# Patient Record
Sex: MALE | Race: WHITE | NOT HISPANIC OR LATINO | Employment: FULL TIME | URBAN - METROPOLITAN AREA
[De-identification: names, ages, dates, MRNs, and addresses within clinical notes are randomized per-mention and may not be internally consistent; named-entity substitution may affect disease eponyms.]

---

## 2019-03-28 ENCOUNTER — TELEPHONE (OUTPATIENT)
Dept: INTERNAL MEDICINE | Facility: CLINIC | Age: 49
End: 2019-03-28

## 2019-03-28 ENCOUNTER — DOCUMENTATION (OUTPATIENT)
Dept: INTERNAL MEDICINE | Facility: CLINIC | Age: 49
End: 2019-03-28

## 2019-03-28 ENCOUNTER — OFFICE VISIT (OUTPATIENT)
Dept: INTERNAL MEDICINE | Facility: CLINIC | Age: 49
End: 2019-03-28

## 2019-03-28 VITALS
HEIGHT: 74 IN | TEMPERATURE: 98.4 F | BODY MASS INDEX: 22.28 KG/M2 | HEART RATE: 67 BPM | WEIGHT: 173.6 LBS | RESPIRATION RATE: 20 BRPM | SYSTOLIC BLOOD PRESSURE: 122 MMHG | DIASTOLIC BLOOD PRESSURE: 84 MMHG | OXYGEN SATURATION: 98 %

## 2019-03-28 DIAGNOSIS — M54.50 ACUTE LOW BACK PAIN WITHOUT SCIATICA, UNSPECIFIED BACK PAIN LATERALITY: Primary | ICD-10-CM

## 2019-03-28 DIAGNOSIS — Z12.5 PROSTATE CANCER SCREENING: Primary | ICD-10-CM

## 2019-03-28 DIAGNOSIS — E78.5 HYPERLIPIDEMIA, UNSPECIFIED HYPERLIPIDEMIA TYPE: ICD-10-CM

## 2019-03-28 PROBLEM — I77.810 DILATED AORTIC ROOT: Status: ACTIVE | Noted: 2019-03-28

## 2019-03-28 PROCEDURE — 99213 OFFICE O/P EST LOW 20 MIN: CPT | Performed by: INTERNAL MEDICINE

## 2019-03-28 RX ORDER — MELOXICAM 7.5 MG/1
7.5 TABLET ORAL DAILY
Qty: 30 TABLET | Refills: 0 | Status: SHIPPED | OUTPATIENT
Start: 2019-03-28 | End: 2020-10-08

## 2019-03-28 RX ORDER — CYCLOBENZAPRINE HCL 10 MG
10 TABLET ORAL
Qty: 30 TABLET | Refills: 0 | Status: SHIPPED | OUTPATIENT
Start: 2019-03-28 | End: 2020-10-08

## 2019-03-28 RX ORDER — NAPROXEN SODIUM 220 MG
220 TABLET ORAL 2 TIMES DAILY PRN
COMMUNITY
End: 2020-10-08

## 2019-03-28 NOTE — TELEPHONE ENCOUNTER
Called patient, he did not want to schedule his follow up 5 mos with labs at this time , but will call back to schedule.

## 2019-03-28 NOTE — PROGRESS NOTES
Subjective   Tae Jung is a 48 y.o. male who presents for     Chief Complaint   Patient presents with   • Follow-up     C/o back pain         PHQ-2 Depression Screening  Little interest or pleasure in doing things? 0   Feeling down, depressed, or hopeless? 0   PHQ-2 Total Score 0             His back has been  Hurting for 3 weeks. He did not injure it. He denies leg weakness or incontinence. Heating pad may help. Mowing the grass may have mad it worse. He has taken Naproxen twice. The pain can go down his left leg some.       Patient Active Problem List   Diagnosis   • Acute low back pain without sciatica   • Hyperlipemia   • Dilated aortic root (CMS/HCC)       No Known Allergies    Current Outpatient Medications on File Prior to Visit   Medication Sig Dispense Refill   • naproxen sodium (ALEVE) 220 MG tablet Take 220 mg by mouth 2 (Two) Times a Day As Needed.       No current facility-administered medications on file prior to visit.        Past Medical History:   Diagnosis Date   • Allergic    • Aortic root enlargement (CMS/HCC)    • Back pain    • Gilbert's disease    • Hemorrhoids    • Hyperlipidemia    • Hypertension    • Low back pain    • Low HDL (under 40)    • Rectal bleeding        Family History   Problem Relation Age of Onset   • Diabetes Mother    • Hypertension Mother    • Hyperlipidemia Mother    • Kidney disease Mother    • Lung cancer Father        Social History     Socioeconomic History   • Marital status:      Spouse name: Not on file   • Number of children: Not on file   • Years of education: Not on file   • Highest education level: Not on file   Tobacco Use   • Smoking status: Never Smoker   • Smokeless tobacco: Never Used   Substance and Sexual Activity   • Alcohol use: Yes     Alcohol/week: 0.6 oz     Types: 1 Glasses of wine per week     Comment: occassional   • Drug use: No   • Sexual activity: Yes     Partners: Female       Past Surgical History:   Procedure Laterality Date  "  • COLONOSCOPY  2006   • HERNIA REPAIR     • KNEE ACL RECONSTRUCTION     • WISDOM TOOTH EXTRACTION         The following portions of the patient's history were reviewed and updated as appropriate: problem list, allergies, past medical history, past family history, past social history and past surgical history.    Review of Systems   Constitutional: Negative for chills and fever.   Musculoskeletal: Positive for back pain.       Objective   Vitals:    03/28/19 1048   BP: 122/84   Pulse: 67   Resp: 20   Temp: 98.4 °F (36.9 °C)   TempSrc: Oral   SpO2: 98%   Weight: 78.7 kg (173 lb 9.6 oz)   Height: 188 cm (74\")     Physical Exam   Constitutional: He appears well-developed and well-nourished.   Cardiovascular: Normal rate, regular rhythm and normal heart sounds.   Pulmonary/Chest: Effort normal and breath sounds normal.   Musculoskeletal:   5/5 strength in his legs  Neg SLR  1+ DTRs in legs   Vitals reviewed.      Procedures    Assessment/Plan   Tae was seen today for follow-up.    Diagnoses and all orders for this visit:    Acute low back pain without sciatica, unspecified back pain laterality    Hyperlipidemia, unspecified hyperlipidemia type  -     Comprehensive metabolic panel; Future  -     Lipid Panel w/ Chol/HDL Ratio; Future    Other orders  -     meloxicam (MOBIC) 7.5 MG tablet; Take 1 tablet by mouth Daily.  -     cyclobenzaprine (FLEXERIL) 10 MG tablet; Take 1 tablet by mouth Daily With Dinner.        Return in about 5 months (around 8/28/2019).             "

## 2019-08-28 ENCOUNTER — RESULTS ENCOUNTER (OUTPATIENT)
Dept: INTERNAL MEDICINE | Facility: CLINIC | Age: 49
End: 2019-08-28

## 2019-08-28 DIAGNOSIS — E78.5 HYPERLIPIDEMIA, UNSPECIFIED HYPERLIPIDEMIA TYPE: ICD-10-CM

## 2019-08-28 DIAGNOSIS — Z12.5 PROSTATE CANCER SCREENING: ICD-10-CM

## 2020-03-27 ENCOUNTER — TELEPHONE (OUTPATIENT)
Dept: INTERNAL MEDICINE | Facility: CLINIC | Age: 50
End: 2020-03-27

## 2020-03-27 DIAGNOSIS — Z00.00 WELLNESS EXAMINATION: Primary | ICD-10-CM

## 2020-03-27 DIAGNOSIS — Z12.5 PROSTATE CANCER SCREENING: ICD-10-CM

## 2020-03-27 RX ORDER — ALBUTEROL SULFATE 90 UG/1
2 AEROSOL, METERED RESPIRATORY (INHALATION) EVERY 6 HOURS PRN
Qty: 6.7 G | Refills: 0 | Status: SHIPPED | OUTPATIENT
Start: 2020-03-27 | End: 2020-10-08

## 2020-06-25 ENCOUNTER — RESULTS ENCOUNTER (OUTPATIENT)
Dept: INTERNAL MEDICINE | Facility: CLINIC | Age: 50
End: 2020-06-25

## 2020-06-25 DIAGNOSIS — Z12.5 PROSTATE CANCER SCREENING: ICD-10-CM

## 2020-06-25 DIAGNOSIS — Z00.00 WELLNESS EXAMINATION: ICD-10-CM

## 2020-08-21 ENCOUNTER — TELEPHONE (OUTPATIENT)
Dept: INTERNAL MEDICINE | Facility: CLINIC | Age: 50
End: 2020-08-21

## 2020-10-08 ENCOUNTER — OFFICE VISIT (OUTPATIENT)
Dept: INTERNAL MEDICINE | Facility: CLINIC | Age: 50
End: 2020-10-08

## 2020-10-08 VITALS
SYSTOLIC BLOOD PRESSURE: 122 MMHG | HEIGHT: 74 IN | DIASTOLIC BLOOD PRESSURE: 78 MMHG | RESPIRATION RATE: 20 BRPM | TEMPERATURE: 97.3 F | OXYGEN SATURATION: 96 % | HEART RATE: 81 BPM | WEIGHT: 163.6 LBS | BODY MASS INDEX: 21 KG/M2

## 2020-10-08 DIAGNOSIS — Z11.59 NEED FOR HEPATITIS C SCREENING TEST: ICD-10-CM

## 2020-10-08 DIAGNOSIS — Z12.5 SCREENING FOR PROSTATE CANCER: ICD-10-CM

## 2020-10-08 DIAGNOSIS — Z00.00 WELLNESS EXAMINATION: Primary | ICD-10-CM

## 2020-10-08 DIAGNOSIS — I77.810 DILATED AORTIC ROOT (HCC): ICD-10-CM

## 2020-10-08 PROBLEM — E78.49 OTHER HYPERLIPIDEMIA: Status: ACTIVE | Noted: 2019-03-28

## 2020-10-08 PROCEDURE — 99396 PREV VISIT EST AGE 40-64: CPT | Performed by: INTERNAL MEDICINE

## 2020-10-08 PROCEDURE — 90686 IIV4 VACC NO PRSV 0.5 ML IM: CPT | Performed by: INTERNAL MEDICINE

## 2020-10-08 PROCEDURE — 90471 IMMUNIZATION ADMIN: CPT | Performed by: INTERNAL MEDICINE

## 2020-10-09 ENCOUNTER — RESULTS ENCOUNTER (OUTPATIENT)
Dept: INTERNAL MEDICINE | Facility: CLINIC | Age: 50
End: 2020-10-09

## 2020-10-09 DIAGNOSIS — Z00.00 WELLNESS EXAMINATION: ICD-10-CM

## 2020-10-09 DIAGNOSIS — Z11.59 NEED FOR HEPATITIS C SCREENING TEST: ICD-10-CM

## 2020-10-09 DIAGNOSIS — Z12.5 SCREENING FOR PROSTATE CANCER: ICD-10-CM

## 2020-10-30 LAB
ALBUMIN SERPL-MCNC: 4.8 G/DL (ref 3.5–5.2)
ALBUMIN/GLOB SERPL: 2.7 G/DL
ALP SERPL-CCNC: 75 U/L (ref 39–117)
ALT SERPL-CCNC: 26 U/L (ref 1–41)
AST SERPL-CCNC: 20 U/L (ref 1–40)
BILIRUB SERPL-MCNC: 1.2 MG/DL (ref 0–1.2)
BUN SERPL-MCNC: 12 MG/DL (ref 6–20)
BUN/CREAT SERPL: 13.8 (ref 7–25)
CALCIUM SERPL-MCNC: 9.6 MG/DL (ref 8.6–10.5)
CHLORIDE SERPL-SCNC: 103 MMOL/L (ref 98–107)
CHOLEST SERPL-MCNC: 212 MG/DL (ref 0–200)
CHOLEST/HDLC SERPL: 5.73 {RATIO}
CO2 SERPL-SCNC: 30.3 MMOL/L (ref 22–29)
CREAT SERPL-MCNC: 0.87 MG/DL (ref 0.76–1.27)
GLOBULIN SER CALC-MCNC: 1.8 GM/DL
GLUCOSE SERPL-MCNC: 89 MG/DL (ref 65–99)
HCV AB S/CO SERPL IA: <0.1 S/CO RATIO (ref 0–0.9)
HDLC SERPL-MCNC: 37 MG/DL (ref 40–60)
LDLC SERPL CALC-MCNC: 137 MG/DL (ref 0–100)
POTASSIUM SERPL-SCNC: 4.1 MMOL/L (ref 3.5–5.2)
PROT SERPL-MCNC: 6.6 G/DL (ref 6–8.5)
PSA SERPL-MCNC: 0.58 NG/ML (ref 0–4)
SODIUM SERPL-SCNC: 141 MMOL/L (ref 136–145)
TRIGL SERPL-MCNC: 210 MG/DL (ref 0–150)
VLDLC SERPL CALC-MCNC: 38 MG/DL (ref 5–40)

## 2020-11-05 ENCOUNTER — TELEPHONE (OUTPATIENT)
Dept: INTERNAL MEDICINE | Facility: CLINIC | Age: 50
End: 2020-11-05

## 2020-11-05 NOTE — TELEPHONE ENCOUNTER
PATIENT IS CALLING BECAUSE HE HAS COVID-19. HE HAS QUESTIONS FOR . PLEASE CALL HIM BACK AS SOON AS POSSIBLE.    BEST PH#: [phone]

## 2020-11-09 ENCOUNTER — TELEPHONE (OUTPATIENT)
Dept: INTERNAL MEDICINE | Facility: CLINIC | Age: 50
End: 2020-11-09

## 2020-11-09 NOTE — TELEPHONE ENCOUNTER
He needs to check a pulse ox if possible and if below 92 then needs to go to hospital    If no pulse ox then would recc Er if getting more short of breath    He can use heating pad for pulled muscle

## 2020-11-09 NOTE — TELEPHONE ENCOUNTER
PATIENT CALLED AND STATED HE TESTED POSITIVE 11/3/20 FOR COVID-19  PATIENT STATES HE THINKS HE PULLED A MUSCLE IN HIS CHEST AND IT IS SOMEWHAT DIFFICULT FOR HIM TO BREATHE.  PATIENT WOULD LIKE A CALL BACK WITH WHAT STEPS HE SHOULD TAKE NEXT.     PLEASE ADVISE: [phone]

## 2021-06-17 ENCOUNTER — TELEPHONE (OUTPATIENT)
Dept: INTERNAL MEDICINE | Facility: CLINIC | Age: 51
End: 2021-06-17

## 2021-06-17 NOTE — TELEPHONE ENCOUNTER
Caller: Sharla Jung    Relationship: Self    Best call back number: [phone] ([phone]    What is the best time to reach you: ANYTIME    Who are you requesting to speak with (clinical staff, provider,  specific staff member): DR RODRIGUEZ    Do you know the name of the person who called: SHARLA JUNG    What was the call regarding: PATIENT STATED RECEIVED THE 2ND COVID VACCINE APPROXIMATELY 6 WEEKS ON April 29, 2021, NOW PATIENT OFF AND ON IS EXPERIENCING FATIGUE AND FEELS LIKE LEGS ARE FILLED WITH LEAD WHEN HE GOES RUNNING, NOT FEELING 100%, PATIENT WANTS TO KNOW WHAT THE NEXT STEPS ARE, PLEASE ADVISE    Do you require a callback: YES

## 2021-06-18 ENCOUNTER — OFFICE VISIT (OUTPATIENT)
Dept: INTERNAL MEDICINE | Facility: CLINIC | Age: 51
End: 2021-06-18

## 2021-06-18 VITALS
SYSTOLIC BLOOD PRESSURE: 122 MMHG | HEIGHT: 74 IN | DIASTOLIC BLOOD PRESSURE: 78 MMHG | BODY MASS INDEX: 20.79 KG/M2 | WEIGHT: 162 LBS | TEMPERATURE: 97.8 F

## 2021-06-18 DIAGNOSIS — I77.810 DILATED AORTIC ROOT (HCC): Primary | ICD-10-CM

## 2021-06-18 DIAGNOSIS — R53.83 FATIGUE, UNSPECIFIED TYPE: ICD-10-CM

## 2021-06-18 PROCEDURE — 99214 OFFICE O/P EST MOD 30 MIN: CPT | Performed by: INTERNAL MEDICINE

## 2021-06-18 PROCEDURE — 93000 ELECTROCARDIOGRAM COMPLETE: CPT | Performed by: INTERNAL MEDICINE

## 2021-06-18 NOTE — PROGRESS NOTES
Subjective        Chief Complaint   Patient presents with   • Fatigue   • Lower Extremity Issue           Tae Jung is a 51 y.o. male who presents for    Patient Active Problem List   Diagnosis   • Other hyperlipidemia   • Dilated aortic root (CMS/HCC)       History of Present Illness     He got his second COVID shot on April 29th. He is tired after some of his runs; it can start 6-7 miles into his runs. His legs feel heavy. His thighs can feel like he has weights on them. He denies chest pain or dyspnea. He thinks it may have started after his second COVID shot.  No Known Allergies    No current outpatient medications on file prior to visit.     No current facility-administered medications on file prior to visit.       Past Medical History:   Diagnosis Date   • Allergic    • Back pain    • Gilbert's disease    • Hemorrhoids    • Hypertension    • Low back pain    • Low HDL (under 40)    • Rectal bleeding        Past Surgical History:   Procedure Laterality Date   • COLONOSCOPY  04/10/2019    repeat 10 years Dr. Montez   • HERNIA REPAIR     • KNEE ACL RECONSTRUCTION     • WISDOM TOOTH EXTRACTION         Family History   Problem Relation Age of Onset   • Diabetes Mother    • Hypertension Mother    • Hyperlipidemia Mother    • Kidney disease Mother    • Lung cancer Father        Social History     Socioeconomic History   • Marital status:      Spouse name: Not on file   • Number of children: Not on file   • Years of education: Not on file   • Highest education level: Not on file   Tobacco Use   • Smoking status: Never Smoker   • Smokeless tobacco: Never Used   Substance and Sexual Activity   • Alcohol use: Yes     Alcohol/week: 1.0 standard drinks     Types: 1 Glasses of wine per week     Comment: occassional   • Drug use: No   • Sexual activity: Yes     Partners: Female           The following portions of the patient's history were reviewed and updated as appropriate: problem list, allergies, current  "medications, past medical history, past family history, past social history and past surgical history.    Review of Systems    Immunization History   Administered Date(s) Administered   • COVID-19 (PFIZER) 04/01/2021, 04/29/2021   • DTaP 01/01/2005   • Flu Vaccine Quad PF >18YRS 01/04/2019   • Flu Vaccine Quad PF >36MO 10/01/2018   • Flulaval/Fluarix/Fluzone Quad 10/08/2020   • Hepatitis A 07/03/2018, 01/04/2019   • Tdap 04/26/2016       Objective   Vitals:    06/18/21 1538   BP: 122/78   Temp: 97.8 °F (36.6 °C)   Weight: 73.5 kg (162 lb)   Height: 188 cm (74\")     Body mass index is 20.8 kg/m².  Physical Exam  Vitals reviewed.   Constitutional:       Appearance: He is well-developed.   HENT:      Head: Normocephalic and atraumatic.   Cardiovascular:      Rate and Rhythm: Normal rate and regular rhythm.      Heart sounds: Normal heart sounds, S1 normal and S2 normal.      Comments: 2+ DP/PT in both feet  Pulmonary:      Effort: Pulmonary effort is normal.      Breath sounds: Normal breath sounds.   Skin:     General: Skin is warm.   Neurological:      Mental Status: He is alert.   Psychiatric:         Behavior: Behavior normal.           ECG 12 Lead    Date/Time: 6/18/2021 5:27 PM  Performed by: Dejon Templeton MD  Authorized by: Dejon Templeton MD   Comparison: not compared with previous ECG   Rhythm: sinus rhythm  Rate: normal  Conduction: incomplete right bundle branch block  Other findings: left ventricular hypertrophy    Clinical impression: abnormal EKG            Assessment/Plan   Diagnoses and all orders for this visit:    1. Dilated aortic root (CMS/HCC) (Primary)  -     Adult Transthoracic Echo Complete W/ Cont if Necessary Per Protocol; Future  -     ECG 12 Lead    2. Fatigue, unspecified type  -     CBC & Differential  -     Comprehensive Metabolic Panel  -     TSH  -     CK             No evidence of pericarditis on EKG. I do not have any of his old EKGs from [facility] to compare to see if LVH is " new. He is due to look at his aortic root so I will get an echo next week to look at root and for LVH; encouraged not to run until we get.    Labs next week given some fatigue since second COVID shot. He does not have symptoms or exam c/w PAD.    No follow-ups on file.  Answers for HPI/ROS submitted by the patient on 6/18/2021  What is the primary reason for your visit?: Other  Please describe your symptoms.: Legs feel really heavy. Little more fatigued in the evening than usual.  Have you had these symptoms before?: No  How long have you been having these symptoms?: Greater than 2 weeks  Please describe any probable cause for these symptoms. : COVID and/or vaccine?

## 2021-06-22 LAB
ALBUMIN SERPL-MCNC: 5 G/DL (ref 3.5–5.2)
ALBUMIN/GLOB SERPL: 2.9 G/DL
ALP SERPL-CCNC: 73 U/L (ref 39–117)
ALT SERPL-CCNC: 16 U/L (ref 1–41)
AST SERPL-CCNC: 15 U/L (ref 1–40)
BASOPHILS # BLD AUTO: 0.05 10*3/MM3 (ref 0–0.2)
BASOPHILS NFR BLD AUTO: 1.1 % (ref 0–1.5)
BILIRUB SERPL-MCNC: 1.3 MG/DL (ref 0–1.2)
BUN SERPL-MCNC: 9 MG/DL (ref 6–20)
BUN/CREAT SERPL: 9.7 (ref 7–25)
CALCIUM SERPL-MCNC: 10.3 MG/DL (ref 8.6–10.5)
CHLORIDE SERPL-SCNC: 103 MMOL/L (ref 98–107)
CK SERPL-CCNC: 85 U/L (ref 20–200)
CO2 SERPL-SCNC: 27.7 MMOL/L (ref 22–29)
CREAT SERPL-MCNC: 0.93 MG/DL (ref 0.76–1.27)
EOSINOPHIL # BLD AUTO: 0.32 10*3/MM3 (ref 0–0.4)
EOSINOPHIL NFR BLD AUTO: 7 % (ref 0.3–6.2)
ERYTHROCYTE [DISTWIDTH] IN BLOOD BY AUTOMATED COUNT: 12.5 % (ref 12.3–15.4)
GLOBULIN SER CALC-MCNC: 1.7 GM/DL
GLUCOSE SERPL-MCNC: 98 MG/DL (ref 65–99)
HCT VFR BLD AUTO: 41.1 % (ref 37.5–51)
HGB BLD-MCNC: 13.6 G/DL (ref 13–17.7)
IMM GRANULOCYTES # BLD AUTO: 0.01 10*3/MM3 (ref 0–0.05)
IMM GRANULOCYTES NFR BLD AUTO: 0.2 % (ref 0–0.5)
LYMPHOCYTES # BLD AUTO: 1.42 10*3/MM3 (ref 0.7–3.1)
LYMPHOCYTES NFR BLD AUTO: 30.9 % (ref 19.6–45.3)
MCH RBC QN AUTO: 30 PG (ref 26.6–33)
MCHC RBC AUTO-ENTMCNC: 33.1 G/DL (ref 31.5–35.7)
MCV RBC AUTO: 90.5 FL (ref 79–97)
MONOCYTES # BLD AUTO: 0.4 10*3/MM3 (ref 0.1–0.9)
MONOCYTES NFR BLD AUTO: 8.7 % (ref 5–12)
NEUTROPHILS # BLD AUTO: 2.4 10*3/MM3 (ref 1.7–7)
NEUTROPHILS NFR BLD AUTO: 52.1 % (ref 42.7–76)
NRBC BLD AUTO-RTO: 0 /100 WBC (ref 0–0.2)
PLATELET # BLD AUTO: 201 10*3/MM3 (ref 140–450)
POTASSIUM SERPL-SCNC: 4.3 MMOL/L (ref 3.5–5.2)
PROT SERPL-MCNC: 6.7 G/DL (ref 6–8.5)
RBC # BLD AUTO: 4.54 10*6/MM3 (ref 4.14–5.8)
SODIUM SERPL-SCNC: 140 MMOL/L (ref 136–145)
TSH SERPL DL<=0.005 MIU/L-ACNC: 1.61 UIU/ML (ref 0.27–4.2)
WBC # BLD AUTO: 4.6 10*3/MM3 (ref 3.4–10.8)

## 2021-06-28 ENCOUNTER — HOSPITAL ENCOUNTER (OUTPATIENT)
Dept: CARDIOLOGY | Facility: HOSPITAL | Age: 51
Discharge: HOME | End: 2021-06-28
Admitting: INTERNAL MEDICINE

## 2021-06-28 VITALS
HEIGHT: 74 IN | WEIGHT: 162 LBS | HEART RATE: 64 BPM | OXYGEN SATURATION: 98 % | SYSTOLIC BLOOD PRESSURE: 120 MMHG | BODY MASS INDEX: 20.79 KG/M2 | DIASTOLIC BLOOD PRESSURE: 80 MMHG

## 2021-06-28 DIAGNOSIS — I77.810 DILATED AORTIC ROOT (HCC): ICD-10-CM

## 2021-06-28 PROCEDURE — 93306 TTE W/DOPPLER COMPLETE: CPT

## 2021-06-28 PROCEDURE — 93306 TTE W/DOPPLER COMPLETE: CPT | Performed by: INTERNAL MEDICINE

## 2021-06-29 LAB
AORTIC ARCH: 3.6 CM
ASCENDING AORTA: 3.5 CM
BH CV ECHO MEAS - ACS: 2.5 CM
BH CV ECHO MEAS - AO MAX PG (FULL): 1.1 MMHG
BH CV ECHO MEAS - AO MAX PG: 8.3 MMHG
BH CV ECHO MEAS - AO MEAN PG (FULL): 0.26 MMHG
BH CV ECHO MEAS - AO MEAN PG: 4.7 MMHG
BH CV ECHO MEAS - AO ROOT AREA (BSA CORRECTED): 1.9
BH CV ECHO MEAS - AO ROOT AREA: 11 CM^2
BH CV ECHO MEAS - AO ROOT DIAM: 3.7 CM
BH CV ECHO MEAS - AO V2 MAX: 143.9 CM/SEC
BH CV ECHO MEAS - AO V2 MEAN: 101.4 CM/SEC
BH CV ECHO MEAS - AO V2 VTI: 30.8 CM
BH CV ECHO MEAS - ASC AORTA: 3.5 CM
BH CV ECHO MEAS - AVA(I,A): 3.6 CM^2
BH CV ECHO MEAS - AVA(I,D): 3.6 CM^2
BH CV ECHO MEAS - AVA(V,A): 3.9 CM^2
BH CV ECHO MEAS - AVA(V,D): 3.9 CM^2
BH CV ECHO MEAS - BSA(HAYCOCK): 2 M^2
BH CV ECHO MEAS - BSA: 2 M^2
BH CV ECHO MEAS - BZI_BMI: 20.8 KILOGRAMS/M^2
BH CV ECHO MEAS - BZI_METRIC_HEIGHT: 188 CM
BH CV ECHO MEAS - BZI_METRIC_WEIGHT: 73.5 KG
BH CV ECHO MEAS - EDV(MOD-SP2): 96 ML
BH CV ECHO MEAS - EDV(MOD-SP4): 126 ML
BH CV ECHO MEAS - EDV(TEICH): 101.2 ML
BH CV ECHO MEAS - EF(CUBED): 77.5 %
BH CV ECHO MEAS - EF(MOD-BP): 60.3 %
BH CV ECHO MEAS - EF(MOD-SP2): 61.5 %
BH CV ECHO MEAS - EF(MOD-SP4): 61.1 %
BH CV ECHO MEAS - EF(TEICH): 69.6 %
BH CV ECHO MEAS - ESV(MOD-SP2): 37 ML
BH CV ECHO MEAS - ESV(MOD-SP4): 49 ML
BH CV ECHO MEAS - ESV(TEICH): 30.7 ML
BH CV ECHO MEAS - FS: 39.2 %
BH CV ECHO MEAS - IVS/LVPW: 1.1
BH CV ECHO MEAS - IVSD: 1.1 CM
BH CV ECHO MEAS - LAT PEAK E' VEL: 14.3 CM/SEC
BH CV ECHO MEAS - LV DIASTOLIC VOL/BSA (35-75): 63.4 ML/M^2
BH CV ECHO MEAS - LV MASS(C)D: 177.4 GRAMS
BH CV ECHO MEAS - LV MASS(C)DI: 89.3 GRAMS/M^2
BH CV ECHO MEAS - LV MAX PG: 7.2 MMHG
BH CV ECHO MEAS - LV MEAN PG: 4.5 MMHG
BH CV ECHO MEAS - LV SYSTOLIC VOL/BSA (12-30): 24.7 ML/M^2
BH CV ECHO MEAS - LV V1 MAX: 134 CM/SEC
BH CV ECHO MEAS - LV V1 MEAN: 101 CM/SEC
BH CV ECHO MEAS - LV V1 VTI: 26.9 CM
BH CV ECHO MEAS - LVIDD: 4.7 CM
BH CV ECHO MEAS - LVIDS: 2.8 CM
BH CV ECHO MEAS - LVLD AP2: 9 CM
BH CV ECHO MEAS - LVLD AP4: 8.6 CM
BH CV ECHO MEAS - LVLS AP2: 6.6 CM
BH CV ECHO MEAS - LVLS AP4: 7.2 CM
BH CV ECHO MEAS - LVOT AREA (M): 4.2 CM^2
BH CV ECHO MEAS - LVOT AREA: 4.1 CM^2
BH CV ECHO MEAS - LVOT DIAM: 2.3 CM
BH CV ECHO MEAS - LVPWD: 1 CM
BH CV ECHO MEAS - MED PEAK E' VEL: 7.5 CM/SEC
BH CV ECHO MEAS - MV A DUR: 0.1 SEC
BH CV ECHO MEAS - MV A MAX VEL: 52 CM/SEC
BH CV ECHO MEAS - MV DEC SLOPE: 366.7 CM/SEC^2
BH CV ECHO MEAS - MV DEC TIME: 0.15 SEC
BH CV ECHO MEAS - MV E MAX VEL: 70.4 CM/SEC
BH CV ECHO MEAS - MV E/A: 1.4
BH CV ECHO MEAS - MV MAX PG: 2.4 MMHG
BH CV ECHO MEAS - MV MEAN PG: 1.3 MMHG
BH CV ECHO MEAS - MV P1/2T MAX VEL: 79.3 CM/SEC
BH CV ECHO MEAS - MV P1/2T: 63.3 MSEC
BH CV ECHO MEAS - MV V2 MAX: 77.1 CM/SEC
BH CV ECHO MEAS - MV V2 MEAN: 54.2 CM/SEC
BH CV ECHO MEAS - MV V2 VTI: 28.5 CM
BH CV ECHO MEAS - MVA P1/2T LCG: 2.8 CM^2
BH CV ECHO MEAS - MVA(P1/2T): 3.5 CM^2
BH CV ECHO MEAS - MVA(VTI): 3.9 CM^2
BH CV ECHO MEAS - PA ACC TIME: 0.09 SEC
BH CV ECHO MEAS - PA MAX PG (FULL): 0.51 MMHG
BH CV ECHO MEAS - PA MAX PG: 3 MMHG
BH CV ECHO MEAS - PA PR(ACCEL): 39.8 MMHG
BH CV ECHO MEAS - PA V2 MAX: 86.9 CM/SEC
BH CV ECHO MEAS - PULM A REVS DUR: 0.07 SEC
BH CV ECHO MEAS - PULM A REVS VEL: 27.1 CM/SEC
BH CV ECHO MEAS - PULM DIAS VEL: 34.3 CM/SEC
BH CV ECHO MEAS - PULM S/D: 2
BH CV ECHO MEAS - PULM SYS VEL: 69.5 CM/SEC
BH CV ECHO MEAS - PVA(V,A): 3.7 CM^2
BH CV ECHO MEAS - PVA(V,D): 3.7 CM^2
BH CV ECHO MEAS - QP/QS: 0.75
BH CV ECHO MEAS - RAP SYSTOLE: 3 MMHG
BH CV ECHO MEAS - RV MAX PG: 2.5 MMHG
BH CV ECHO MEAS - RV MEAN PG: 1.7 MMHG
BH CV ECHO MEAS - RV V1 MAX: 79.3 CM/SEC
BH CV ECHO MEAS - RV V1 MEAN: 63.4 CM/SEC
BH CV ECHO MEAS - RV V1 VTI: 20.7 CM
BH CV ECHO MEAS - RVOT AREA: 4 CM^2
BH CV ECHO MEAS - RVOT DIAM: 2.3 CM
BH CV ECHO MEAS - RVSP: 28 MMHG
BH CV ECHO MEAS - SI(AO): 169.8 ML/M^2
BH CV ECHO MEAS - SI(CUBED): 39.9 ML/M^2
BH CV ECHO MEAS - SI(LVOT): 56.1 ML/M^2
BH CV ECHO MEAS - SI(MOD-SP2): 29.7 ML/M^2
BH CV ECHO MEAS - SI(MOD-SP4): 38.8 ML/M^2
BH CV ECHO MEAS - SI(TEICH): 35.5 ML/M^2
BH CV ECHO MEAS - SUP REN AO DIAM: 1.9 CM
BH CV ECHO MEAS - SV(AO): 337.3 ML
BH CV ECHO MEAS - SV(CUBED): 79.3 ML
BH CV ECHO MEAS - SV(LVOT): 111.5 ML
BH CV ECHO MEAS - SV(MOD-SP2): 59 ML
BH CV ECHO MEAS - SV(MOD-SP4): 77 ML
BH CV ECHO MEAS - SV(RVOT): 83.9 ML
BH CV ECHO MEAS - SV(TEICH): 70.5 ML
BH CV ECHO MEAS - TAPSE (>1.6): 2.5 CM
BH CV ECHO MEAS - TR MAX VEL: 249.8 CM/SEC
BH CV ECHO MEASUREMENTS AVERAGE E/E' RATIO: 6.46
BH CV VAS BP RIGHT ARM: NORMAL MMHG
BH CV XLRA - RV BASE: 3.3 CM
BH CV XLRA - RV LENGTH: 9 CM
BH CV XLRA - RV MID: 3.5 CM
BH CV XLRA - TDI S': 12.4 CM/SEC
LEFT ATRIUM VOLUME INDEX: 40 ML/M2
LV EF 2D ECHO EST: 60 %
MAXIMAL PREDICTED HEART RATE: 170 BPM
SINUS: 3.4 CM
STJ: 3.4 CM
STRESS TARGET HR: 145 BPM

## 2021-06-30 ENCOUNTER — TELEPHONE (OUTPATIENT)
Dept: INTERNAL MEDICINE | Facility: CLINIC | Age: 51
End: 2021-06-30

## 2022-12-07 ENCOUNTER — TELEPHONE (OUTPATIENT)
Dept: INTERNAL MEDICINE | Facility: CLINIC | Age: 52
End: 2022-12-07

## 2022-12-07 NOTE — PROGRESS NOTES
Subjective   Chief Complaint   Patient presents with   • Nail Problem     Losing toenail from training for trail race   • left knee pain   • toe nail trauma       History of Present Illness   52 y.o. male presents with cc pain in left knee and losing right great toenail; he is followed by Dr. Templeton.     Reports he is about to lose the toenail on his right great toe. He has been trail running. No pain with it or drainage. Only attached at the medial aspect with bruising noted to the nail bed.     Also having pain in left knee when he runs ongoing for 4 weeks. NKI. No swelling or erythema. No popping. No OTC meds for it. He took 2 weeks off and started running again but only 10-12 miles a week now. Previous knee surgery with Dr. Funes in the past and he wishes to see him again but needs a referral.      Patient Active Problem List   Diagnosis   • Other hyperlipidemia   • Dilated aortic root (HCC)       No Known Allergies    No current outpatient medications on file prior to visit.     No current facility-administered medications on file prior to visit.       Past Medical History:   Diagnosis Date   • Allergic    • Back pain    • Gilbert's disease    • Hemorrhoids    • Hypertension    • Low back pain    • Low HDL (under 40)    • Rectal bleeding        Family History   Problem Relation Age of Onset   • Diabetes Mother    • Hypertension Mother    • Hyperlipidemia Mother    • Kidney disease Mother    • Lung cancer Father        Social History     Socioeconomic History   • Marital status:    Tobacco Use   • Smoking status: Never   • Smokeless tobacco: Never   Substance and Sexual Activity   • Alcohol use: Yes     Alcohol/week: 1.0 standard drink     Types: 1 Glasses of wine per week     Comment: occassional   • Drug use: No   • Sexual activity: Yes     Partners: Female       Past Surgical History:   Procedure Laterality Date   • COLONOSCOPY  04/10/2019    repeat 10 years Dr. Montez   • HERNIA REPAIR     • KNEE  "ACL RECONSTRUCTION     • WISDOM TOOTH EXTRACTION         The following portions of the patient's history were reviewed and updated as appropriate: problem list, allergies, current medications, past medical history, past social history and past surgical history.    Review of Systems    Immunization History   Administered Date(s) Administered   • COVID-19 (PFIZER) PURPLE CAP 04/01/2021, 04/29/2021, 01/04/2022   • DTaP 01/01/2005   • Flu Vaccine Quad PF >36MO 10/01/2018   • FluLaval/Fluzone >6mos 10/08/2020   • Fluzone Quad >6mos (Multi-dose) 01/04/2019   • Hepatitis A 07/03/2018, 01/04/2019   • Tdap 04/26/2016       Objective   Vitals:    12/08/22 0958   Temp: 98.2 °F (36.8 °C)   Weight: 73.5 kg (162 lb)   Height: 188 cm (74.02\")     Body mass index is 20.79 kg/m².  Physical Exam  Constitutional:       Appearance: Normal appearance.   Pulmonary:      Effort: Pulmonary effort is normal.   Musculoskeletal:      Comments: FROM left knee with crepitus noted. Negative ant/post drawer signs. Negative varus/valgus stress. No swelling or erythema.    Skin:     Comments: Right great toenail lifting up off nail bed but still attached at medial aspect. Nail bed bruised.    Neurological:      Mental Status: He is alert.   Psychiatric:         Mood and Affect: Mood normal.         Behavior: Behavior normal.         Procedures    Assessment & Plan   Diagnoses and all orders for this visit:    1. Acute pain of left knee (Primary)  Comments:  Exan unrevealing. Consult Dr. Funes. Patient prefers to have x-ray at that visit. Voltaren gel qid prn advised.   Orders:  -     Ambulatory Referral to Orthopedic Surgery  -     Diclofenac Sodium (VOLTAREN) 1 % gel gel; Apply 4 g topically to the appropriate area as directed 4 (Four) Times a Day.  Dispense: 150 g; Refill: 5    2. Injury of toenail, right, initial encounter  Comments:  Secondary to trailrunning. Almost ready to lift up and off. Looks like begining of new nail at base. Nailbed " bruising noted.       Return for Next scheduled follow up.

## 2022-12-07 NOTE — TELEPHONE ENCOUNTER
Caller: Tae Jung    Relationship: Self    Best call back number: [phone]    What is the medical concern/diagnosis: HURTING OF LEFT KNEE WHEN RUNNING,ABOUT TO LOSE RIGHT BIG TOE TOENAIL    What specialty or service is being requested: PODIATRIST/ORHTOPEDICS    Any additional details: PATIENT STATES HE HAS BEEN HAVING PAIN IN HIS LEFT KNEE WHEN RUNNING AND IS ABOUT TO LOSE THE TOENAIL ON HIS RIGHT BIG TOE AND WANTS TO KNOW WHAT HE SHOULD DO ABOUT THAT. PATIENT WANTING TO KNOW IF HE WOULD NEED A REFERRAL FOR EITHER OF THOSE AND WILL CALL TO DOUBLE CHECK WITH HIS INSURANCE. REQUESTING A CALLBACK WITH UPDATES.

## 2022-12-08 ENCOUNTER — OFFICE VISIT (OUTPATIENT)
Dept: INTERNAL MEDICINE | Facility: CLINIC | Age: 52
End: 2022-12-08

## 2022-12-08 VITALS — HEIGHT: 74 IN | WEIGHT: 162 LBS | TEMPERATURE: 98.2 F | BODY MASS INDEX: 20.79 KG/M2

## 2022-12-08 DIAGNOSIS — M25.562 ACUTE PAIN OF LEFT KNEE: Primary | ICD-10-CM

## 2022-12-08 DIAGNOSIS — S99.921A INJURY OF TOENAIL, RIGHT, INITIAL ENCOUNTER: ICD-10-CM

## 2022-12-08 PROCEDURE — 99213 OFFICE O/P EST LOW 20 MIN: CPT | Performed by: NURSE PRACTITIONER

## 2023-06-09 ENCOUNTER — TELEPHONE (OUTPATIENT)
Dept: INTERNAL MEDICINE | Facility: CLINIC | Age: 53
End: 2023-06-09

## 2023-06-09 NOTE — TELEPHONE ENCOUNTER
"  Caller: Tae Jung \"Kip\"    Relationship: Self    Best call back number: [phone]     What was the call regarding: PATIENT CALLING WANTING TO KNOW WHAT  FOUND THAT WAS WRONG WITH HIS HEART VALVE AND WHAT IT WAS CALLED      "

## 2023-06-12 ENCOUNTER — OFFICE VISIT (OUTPATIENT)
Dept: INTERNAL MEDICINE | Facility: CLINIC | Age: 53
End: 2023-06-12
Payer: COMMERCIAL

## 2023-06-12 VITALS
WEIGHT: 168.2 LBS | DIASTOLIC BLOOD PRESSURE: 92 MMHG | BODY MASS INDEX: 21.58 KG/M2 | SYSTOLIC BLOOD PRESSURE: 132 MMHG | HEIGHT: 74 IN | TEMPERATURE: 97.7 F

## 2023-06-12 DIAGNOSIS — R10.9 LEFT SIDED ABDOMINAL PAIN: Primary | ICD-10-CM

## 2023-06-12 DIAGNOSIS — E78.49 OTHER HYPERLIPIDEMIA: ICD-10-CM

## 2023-06-12 DIAGNOSIS — Z12.5 PROSTATE CANCER SCREENING: ICD-10-CM

## 2023-06-12 PROCEDURE — 99213 OFFICE O/P EST LOW 20 MIN: CPT | Performed by: INTERNAL MEDICINE

## 2023-06-12 NOTE — PROGRESS NOTES
Subjective        Chief Complaint   Patient presents with    Rib Pain     6-8 mo off and on (Left)            Tae Jung is a 53 y.o. male who presents for    Patient Active Problem List   Diagnosis    Other hyperlipidemia    Dilated aortic root       History of Present Illness       He has had pain under his left rib cage for about 8 months. It was dull and annoying. It would last 2 hours. It resolved 3-4 weeks ago. He denies nausea, vomiting, melena, hematochezia, dysuria or hematuria.   No Known Allergies    Current Outpatient Medications on File Prior to Visit   Medication Sig Dispense Refill    [DISCONTINUED] Diclofenac Sodium (VOLTAREN) 1 % gel gel Apply 4 g topically to the appropriate area as directed 4 (Four) Times a Day. (Patient not taking: Reported on 6/12/2023) 150 g 5     No current facility-administered medications on file prior to visit.       Past Medical History:   Diagnosis Date    Allergic     Back pain     Gilbert's disease     Hemorrhoids     Low back pain     Low HDL (under 40)     Rectal bleeding        Past Surgical History:   Procedure Laterality Date    COLONOSCOPY  04/10/2019    repeat 10 years Dr. Montez    HERNIA REPAIR      KNEE ACL RECONSTRUCTION      WISDOM TOOTH EXTRACTION         Family History   Problem Relation Age of Onset    Diabetes Mother     Hypertension Mother     Hyperlipidemia Mother     Kidney disease Mother     Lung cancer Father        Social History     Socioeconomic History    Marital status:    Tobacco Use    Smoking status: Never    Smokeless tobacco: Never   Substance and Sexual Activity    Alcohol use: Yes     Alcohol/week: 1.0 standard drink     Types: 1 Glasses of wine per week     Comment: occassional    Drug use: No    Sexual activity: Yes     Partners: Female           The following portions of the patient's history were reviewed and updated as appropriate: problem list, allergies, current medications, past medical history, past family history,  "past social history, and past surgical history.    Review of Systems    Immunization History   Administered Date(s) Administered    COVID-19 (PFIZER) Purple Cap Monovalent 04/01/2021, 04/29/2021, 01/04/2022    DTaP 01/01/2005    Flu Vaccine Quad PF >36MO 10/01/2018    FluLaval/Fluzone >6mos 10/08/2020    Fluzone Quad >6mos (Multi-dose) 01/04/2019    Hepatitis A 07/03/2018, 01/04/2019    Tdap 04/26/2016       Objective   Vitals:    06/12/23 0937   BP: 132/92   Temp: 97.7 °F (36.5 °C)   Weight: 76.3 kg (168 lb 3.2 oz)   Height: 188 cm (74.02\")     Body mass index is 21.59 kg/m².  Physical Exam  Vitals reviewed.   Constitutional:       Appearance: He is well-developed.   HENT:      Head: Normocephalic and atraumatic.   Cardiovascular:      Rate and Rhythm: Normal rate and regular rhythm.      Heart sounds: Normal heart sounds, S1 normal and S2 normal.   Pulmonary:      Effort: Pulmonary effort is normal.      Breath sounds: Normal breath sounds.   Abdominal:      General: There is no distension.      Palpations: Abdomen is soft. There is no mass.   Skin:     General: Skin is warm.   Neurological:      Mental Status: He is alert.   Psychiatric:         Behavior: Behavior normal.       Procedures    Assessment & Plan   Diagnoses and all orders for this visit:    1. Left sided abdominal pain (Primary)  Comments:  Exam nl and symptoms resolved. If reoccur then I would CT    2. Other hyperlipidemia  -     Comprehensive Metabolic Panel; Future  -     Lipid Panel With / Chol / HDL Ratio; Future    3. Prostate cancer screening  -     PSA Screen; Future        He will check his BP qod and bring in numbers.           Return in about 3 months (around 9/12/2023) for Annual physical, Lab Before FUP.  "

## 2024-08-20 ENCOUNTER — OFFICE VISIT (OUTPATIENT)
Dept: ORTHOPEDIC SURGERY | Facility: CLINIC | Age: 54
End: 2024-08-20
Payer: COMMERCIAL

## 2024-08-20 VITALS — WEIGHT: 174.4 LBS | BODY MASS INDEX: 22.38 KG/M2 | TEMPERATURE: 98 F | HEIGHT: 74 IN

## 2024-08-20 DIAGNOSIS — R52 PAIN: ICD-10-CM

## 2024-08-20 DIAGNOSIS — M17.0 PRIMARY OSTEOARTHRITIS OF BOTH KNEES: Primary | ICD-10-CM

## 2024-08-20 DIAGNOSIS — R52 PAIN: Primary | ICD-10-CM

## 2024-08-20 DIAGNOSIS — M17.0 PRIMARY OSTEOARTHRITIS OF BOTH KNEES: ICD-10-CM

## 2024-08-20 PROCEDURE — 73562 X-RAY EXAM OF KNEE 3: CPT | Performed by: NURSE PRACTITIONER

## 2024-08-20 PROCEDURE — 99214 OFFICE O/P EST MOD 30 MIN: CPT | Performed by: NURSE PRACTITIONER

## 2024-08-20 RX ORDER — MELOXICAM 15 MG/1
15 TABLET ORAL DAILY
Qty: 30 TABLET | Refills: 3 | Status: SHIPPED | OUTPATIENT
Start: 2024-08-20

## 2024-08-20 NOTE — PROGRESS NOTES
"Patient: Tae Jung  YOB: 1970 54 y.o. male  Medical Record Number: [id number]    Chief Complaints:   Chief Complaint   Patient presents with    Left Knee - Establish Care, Pain    Right Knee - Pain, Establish Care       History of Present Illness:Tae Jung is a 54 y.o. male who presents as a new patient with complaints of increasing bilateral knee pain.  The patient had previous ACL surgery approximately 18 years ago had been doing okay but ran a trail race in 2022 at that time started with increased left knee pain, about 8 to 9 weeks ago he was rowing and felt something in his right knee and has had increased right knee pain since then as well.  He has never had injections, formal physical therapy, he does not like taking medications.  Patient reports the knee pain is worse with increased activity and also with running    Allergies: No Known Allergies    Medications:   No current outpatient medications on file.     No current facility-administered medications for this visit.         The following portions of the patient's history were reviewed and updated as appropriate: allergies, current medications, past family history, past medical history, past social history, past surgical history and problem list.    Review of Systems:   14 point review of systems was performed. All systems negative except pertinent positives/negatives listed in HPI above    Physical Exam:   Vitals:    08/20/24 0753   Temp: 98 °F (36.7 °C)   Weight: 79.1 kg (174 lb 6.4 oz)   Height: 188 cm (74\")   PainSc:   5       General: A and O x 3, ASA, NAD   Skin clear no unusual lesions noted  Bilateral knees patient has no appreciable effusion on 120 degrees flexion neutral in extension with a positive medial Sara negative Lachman he also has significant patellofemoral crepitus noted left knee.  Calf soft and nontender       Radiology:  Xrays 3views (ap,lateral, sunrise) bilateral knees were ordered and reviewed today " secondary to increased pain patient has screws noted from previous ACL surgery right knee he has moderate arthritic changes noted of the medial and patellofemoral compartments bilaterally.  No comparative views available    Assessment/Plan: Osteoarthritis bilateral knees with increased pain    Patient and I discussed options, he would like to proceed with physical therapy as well as a prescription for meloxicam to take as needed.  We discussed cortisone injections versus discussed supplementation but the patient would like to try these conservative measures first and will let us know if his pain does not improve      Liberty Saldana, APRN  8/20/2024

## 2024-09-03 ENCOUNTER — TREATMENT (OUTPATIENT)
Dept: PHYSICAL THERAPY | Facility: CLINIC | Age: 54
End: 2024-09-03
Payer: COMMERCIAL

## 2024-09-03 DIAGNOSIS — M17.0 PRIMARY OSTEOARTHRITIS OF BOTH KNEES: Primary | ICD-10-CM

## 2024-09-03 DIAGNOSIS — G89.29 CHRONIC PAIN OF BOTH KNEES: ICD-10-CM

## 2024-09-03 DIAGNOSIS — M25.561 CHRONIC PAIN OF BOTH KNEES: ICD-10-CM

## 2024-09-03 DIAGNOSIS — M25.562 CHRONIC PAIN OF BOTH KNEES: ICD-10-CM

## 2024-09-03 PROCEDURE — 97161 PT EVAL LOW COMPLEX 20 MIN: CPT | Performed by: PHYSICAL THERAPIST

## 2024-09-03 PROCEDURE — 97530 THERAPEUTIC ACTIVITIES: CPT | Performed by: PHYSICAL THERAPIST

## 2024-09-03 PROCEDURE — 97110 THERAPEUTIC EXERCISES: CPT | Performed by: PHYSICAL THERAPIST

## 2024-09-03 NOTE — PROGRESS NOTES
[facility]  [address] [facility] [address]                                                                                   Phone [phone]                                                                                    Fax [phone]    Physical Therapy Initial Evaluation and Plan of Care    Patient: Tae Jung   : 1970  Diagnosis/ICD-10 Code:  Primary osteoarthritis of both knees [M17.0]  Referring practitioner: ROSELYN Thapa  NPI: [id number]                                      Date of Initial Visit: 9/3/2024  Today's Date: 9/3/2024  Patient seen for 1 session         Visit Diagnoses:    ICD-10-CM ICD-9-CM   1. Primary osteoarthritis of both knees  M17.0 715.16   2. Chronic pain of both knees  M25.561 719.46    M25.562 338.29    G89.29          Subjective Questionnaire: LEFS: 60/80      Subjective Evaluation    History of Present Illness  Mechanism of injury: Love to run; started about 16 years ago. Also play golf.  I'll do other things (bike and such), but running is what I love.  Has signed up for an 8 mile trail race this weekend.     Inside of both knees hurt when I run. Left knee started first in Oct 2022 and lowered mileage; right flared up a couple months ago in R knee while rowing.    Runs 4-5 days/week for total of 12-15 miles. starting to run on a softer surface - prefers trail running.  Stopped rowing and started a bit of outdoor cycling though prefers running.     Occasionally it feels a little wobbly vs giving way running on uneven terrain; no swelling or locking.      Radiology:  Xrays 3views (ap,lateral, sunrise) bilateral knees were ordered and reviewed today secondary to increased pain patient has screws noted from previous ACL surgery right knee he has moderate arthritic changes noted of the medial and patellofemoral compartments bilaterally.  No comparative views available    PLOF - running/rowing; Love to run. Also  play golf.  I'll do other things (bike and such), but running is what I love. Yard work, plays golf occasionally          Patient Occupation: works at [employer]  Current pain ratin  At worst pain ratin  Location: medial knee pain  Quality: discomfort and dull ache (stiffness)  Relieving factors: rest  Aggravating factors: stairs (worse descending, getting out of car; sit to stand after prolonged sitting)    Social Support  Lives in: multiple-level home  Lives with: spouse (young adult children)    Patient Goals  Patient goals for therapy: decreased pain  Patient goal: learn what to do to help the pain       Patient Active Problem List    Diagnosis Date Noted    Other hyperlipidemia 2019    Dilated aortic root 2019     Past Medical History:   Diagnosis Date    Allergic     Ankle sprain     Back pain     Gilbert's disease     Hemorrhoids     Low back pain     Low back strain     Low HDL (under 40)     Rectal bleeding      Past Surgical History:   Procedure Laterality Date    COLONOSCOPY  04/10/2019    repeat 10 years Dr. Montez    HERNIA REPAIR      KNEE ACL RECONSTRUCTION      WISDOM TOOTH EXTRACTION           Objective          Tenderness   Left Knee   No tenderness in the medial joint line, medial patella, medial retinaculum and patellar tendon.     Right Knee   No tenderness in the medial joint line, medial patella, medial retinaculum and patellar tendon.     Active Range of Motion   Left Knee   Flexion: 147 degrees     Right Knee   Flexion: 143 degrees     Strength/Myotome Testing     Left Hip   Planes of Motion   Extension: 5  Abduction: 5    Right Hip   Planes of Motion   Extension: 5  Abduction: 5    Left Knee   Flexion: 5  Extension: 5    Tests     Left Hip   Positive Radhika.   Sebas: Negative.     Right Hip   Positive Radhika.   Sebas: Negative.      Left Knee   Negative anterior Lachman, medial Sara, valgus stress test at 0 degrees and valgus stress test at 30 degrees.     Right  Knee   Negative anterior Lachman, medial Sara (PF click), valgus stress test at 0 degrees and valgus stress test at 30 degrees.     Left Hip Flexibility Comments:   HS - SLR to 50 deg    Right Hip Flexibility Comments:   HS - SLR to 50 deg    Ambulation   Weight-Bearing Status   Assistive device used: none    Observational Gait   Gait: within functional limits     Functional Assessment   Squat No pain.     Single Leg Squat   Left Leg  Valgus. No pain.     Right Leg  Valgus. No pain.     Comments  Proactive balance in single leg stance          Assessment & Plan       Assessment  Impairments: lacks appropriate home exercise program and pain with function   Functional limitations: uncomfortable because of pain   Assessment details: Tae Jung is a 54 y.o. male referred to physical therapy for cPrimary osteoarthritis of both knees with associated pain. He presents with a evolving clinical presentation. He has comorbidities of previous R ACL reconstruction and personal factors of long history of distance running that may affect his progress in the plan of care. Signs and symptoms are consistent with referring provider's diagnoses. Pt was educated on course of treatment, possible reasons for pain, activity modifications/joint protection principles and use of ice PRN. Pt was given a copy of HEP. Patient is appropriate for skilled physical therapy in order to to reduce pain and increase ease with daily mobility/tolerance to running.     Prognosis: good    Goals  Plan Goals: STG In 4 weeks  1. Pt to exhibit compliance/independence with HEP without exacerbation of symptoms.  2. Pt is able to perform single leg squat in good alignment demonstrating adequate motor control as needed for running  3. Patient is able to get out of car and descend stairs without pain > 2/10.  4. LEFS improved from 60/80 to at least 66/80 to demonstrate improved functional tolerance.  5. Increase B HS flexiblity to at least 60 deg with SLR  "to reduce stress on tibiofemoral joints.     Plan  Therapy options: will be seen for skilled therapy services  Planned modality interventions: cryotherapy, ultrasound and dry needling  Planned therapy interventions: neuromuscular re-education, therapeutic activities, strengthening, stretching, home exercise program, balance/weight-bearing training and manual therapy  Frequency: probable one time visit; follow up only if symptoms persist.  Duration in weeks: 4  Treatment plan discussed with: patient  Plan details: Access Code: [email or web address]  URL: [email or web address]  Date: 09/03/2024  Prepared by: Sabrina Carmona    Exercises  - Straight Leg Raise with External Rotation  - 1 x daily - 1 sets - 10 reps - 3 hold  - Active Straight Leg Raise with Quad Set  - 1 x daily - 1 sets - 10 reps - 3 hold  - Marching Bridge  - 1 x daily - 1 sets - 10 reps  - Plank with Hip Extension  - 1 x daily - 1 sets - 10 reps  - Side Stepping with Resistance at Thighs  - 1 x daily - 1 sets - 5 reps  - Band Walks  - 1 x daily - 1 sets - 15 reps  - Supine Hamstring Stretch with Strap  - 1 x daily - 1 sets - 2 reps - 30 hold  - Supine ITB Stretch with Strap  - 1 x daily - 1 sets - 2 reps - 30 hold    Patient Education  - Ice    Patient was educated on findings of evaluation, purpose of treatment, and goals for therapy.  Treatment options discussed and questions answered.  Patient was educated on exercises/self treatment/pain relief techniques.  Suggested Naldo Mirza's \"Anatomy for Runners\"  Will consider running gait analysis if symptoms persist.           History # of Personal Factors and/or Comorbidities: MODERATE (1-2)  Examination of Body System(s): # of elements: LOW (1-2)  Clinical Presentation: EVOLVING  Clinical Decision Making: MODERATE      Timed:         Manual Therapy:         mins  23037;     Therapeutic Exercise:  15       mins  38140;     Neuromuscular Hosea:        mins  61393;    Therapeutic Activity:   10       mins  " 67019;     Gait Training:           mins  77848;     Ultrasound:          mins  99668;    Ionto                                   mins   49719  Self Care                            mins   54098    Un-Timed:  Electrical Stimulation:         mins  45400 ( );  Dry Needling   (1-2 muscles)       mins 42868 (Self-pay)  Dry Needling (3-4 muscles)        mins 20561 (Self-pay)  Dry Needling Trial          mins DRYNDLTRIAL  (No Charge)  Traction          mins 98420  Low Eval     20     Mins  34910  Mod Eval          Mins  44502  High Eval                            Mins  02886    Timed Treatment:  25    mins   Total Treatment:    45    mins    PT: Sabrina Carmona PT     [place] License # [id number]    Electronically signed by Sabrina Carmona PT, 09/03/24, 4:05 PM EDT    Certification Period: 9/3/2024 thru 12/1/2024  I certify that the therapy services are furnished while this patient is under my care.  The services outlined above are required by this patient, and will be reviewed every 90 days.         Physician Signature:__________________________________________________    PHYSICIAN: Liberty Saldana APRN  NPI: [id number]                                      DATE:  :     Please sign and return via fax to [phone].  Thank you, [facility]

## 2025-01-30 ENCOUNTER — TELEPHONE (OUTPATIENT)
Dept: INTERNAL MEDICINE | Facility: CLINIC | Age: 55
End: 2025-01-30
Payer: COMMERCIAL

## 2025-01-30 NOTE — TELEPHONE ENCOUNTER
Pt called hub  stating that his BP is running High yesterday and today -yesterday it was 148/96 while @ the dentist and this morning it was  138/95. Please advise

## 2025-02-07 ENCOUNTER — OFFICE VISIT (OUTPATIENT)
Dept: INTERNAL MEDICINE | Facility: CLINIC | Age: 55
End: 2025-02-07
Payer: COMMERCIAL

## 2025-02-07 VITALS
HEIGHT: 74 IN | DIASTOLIC BLOOD PRESSURE: 90 MMHG | SYSTOLIC BLOOD PRESSURE: 144 MMHG | WEIGHT: 178.2 LBS | BODY MASS INDEX: 22.87 KG/M2

## 2025-02-07 DIAGNOSIS — Z12.5 PROSTATE CANCER SCREENING: ICD-10-CM

## 2025-02-07 DIAGNOSIS — I10 ESSENTIAL HYPERTENSION: Primary | Chronic | ICD-10-CM

## 2025-02-07 PROBLEM — E78.00 HIGH CHOLESTEROL: Status: ACTIVE | Noted: 2019-03-28

## 2025-02-07 PROCEDURE — 93000 ELECTROCARDIOGRAM COMPLETE: CPT | Performed by: INTERNAL MEDICINE

## 2025-02-07 PROCEDURE — 99214 OFFICE O/P EST MOD 30 MIN: CPT | Performed by: INTERNAL MEDICINE

## 2025-02-07 RX ORDER — LISINOPRIL 10 MG/1
10 TABLET ORAL DAILY
Qty: 30 TABLET | Refills: 2 | Status: SHIPPED | OUTPATIENT
Start: 2025-02-07

## 2025-02-07 NOTE — PROGRESS NOTES
Subjective        Chief Complaint   Patient presents with    Hypertension           Tae Jung is a 54 y.o. male who presents for    Patient Active Problem List   Diagnosis    High cholesterol    Dilated aortic root    Essential hypertension       History of Present Illness       His BP was high at the dentist. He has been checking at home and it has been 150/95.  He took lisinopril in the past. He has been running about 15 miles per week. He has been under stress taking care of his mother. He does not add salt to his salt. He does not use NSAIDS. He has rare etoh.   No Known Allergies    Current Outpatient Medications on File Prior to Visit   Medication Sig Dispense Refill    [DISCONTINUED] meloxicam (Mobic) 15 MG tablet Take 1 tablet by mouth Daily. (Patient not taking: Reported on 2/7/2025) 30 tablet 3     No current facility-administered medications on file prior to visit.       Past Medical History:   Diagnosis Date    Back pain     Gilbert's disease     Hemorrhoids     Kidney stone 10/2023    Low back pain     Low HDL (under 40)     Rectal bleeding        Past Surgical History:   Procedure Laterality Date    COLONOSCOPY  04/10/2019    repeat 10 years Dr. Montez    EXTRACORPOREAL SHOCK WAVE LITHOTRIPSY (ESWL)  10/2023    HERNIA REPAIR      KNEE ACL RECONSTRUCTION      WISDOM TOOTH EXTRACTION         Family History   Problem Relation Age of Onset    Diabetes Mother     Hypertension Mother     Hyperlipidemia Mother     Kidney disease Mother     Lung cancer Father     Cancer Father        Social History     Socioeconomic History    Marital status:    Tobacco Use    Smoking status: Never    Smokeless tobacco: Never   Vaping Use    Vaping status: Never Used   Substance and Sexual Activity    Alcohol use: Yes     Alcohol/week: 1.0 standard drink of alcohol     Types: 1 Glasses of wine per week     Comment: occassional    Drug use: No    Sexual activity: Yes     Partners: Female           The following  "portions of the patient's history were reviewed and updated as appropriate: problem list, allergies, current medications, past medical history, past family history, past social history, and past surgical history.    Review of Systems    Immunization History   Administered Date(s) Administered    COVID-19 (PFIZER) Purple Cap Monovalent 04/01/2021, 04/29/2021, 01/04/2022    DTaP 01/01/2005    Flu Vaccine Quad PF >36MO 10/01/2018    Fluzone (or Fluarix & Flulaval for VFC) >6mos 10/08/2020    Fluzone Quad >6mos (Multi-dose) 01/04/2019    Hepatitis A 07/03/2018, 01/04/2019    Tdap 04/26/2016       Objective   Vitals:    02/07/25 1421   BP: 144/90   Weight: 80.8 kg (178 lb 3.2 oz)   Height: 188 cm (74.02\")     Body mass index is 22.87 kg/m².  Physical Exam  Vitals reviewed.   Constitutional:       Appearance: He is well-developed.   HENT:      Head: Normocephalic and atraumatic.   Cardiovascular:      Rate and Rhythm: Normal rate and regular rhythm.      Heart sounds: Normal heart sounds, S1 normal and S2 normal.   Pulmonary:      Effort: Pulmonary effort is normal.      Breath sounds: Normal breath sounds.   Skin:     General: Skin is warm.   Neurological:      Mental Status: He is alert.   Psychiatric:         Behavior: Behavior normal.           ECG 12 Lead    Date/Time: 2/7/2025 3:40 PM  Performed by: Dejon Templeton MD    Authorized by: Dejon Templeton MD  Comparison: compared with previous ECG from 6/18/2021  Similar to previous ECG  Rhythm: sinus rhythm  Rate: normal  Other findings: left ventricular hypertrophy    Clinical impression: abnormal EKG  Comments: LVH which is unchanged          Assessment & Plan   Diagnoses and all orders for this visit:    1. Essential hypertension (Primary)  -     lisinopril (PRINIVIL,ZESTRIL) 10 MG tablet; Take 1 tablet by mouth Daily.  Dispense: 30 tablet; Refill: 2  -     Comprehensive Metabolic Panel; Future  -     Lipid Panel With / Chol / HDL Ratio; Future  -     ECG 12 " Lead    2. Prostate cancer screening  -     PSA Screen; Future        Add lisinopril as took several years ago without any problems.           Return in about 5 weeks (around 3/14/2025) for Annual physical, Lab Before FUP.

## 2025-03-17 ENCOUNTER — LAB (OUTPATIENT)
Facility: HOSPITAL | Age: 55
End: 2025-03-17
Payer: COMMERCIAL

## 2025-03-17 ENCOUNTER — OFFICE VISIT (OUTPATIENT)
Dept: INTERNAL MEDICINE | Facility: CLINIC | Age: 55
End: 2025-03-17
Payer: COMMERCIAL

## 2025-03-17 VITALS
HEIGHT: 74 IN | SYSTOLIC BLOOD PRESSURE: 126 MMHG | WEIGHT: 173.8 LBS | DIASTOLIC BLOOD PRESSURE: 88 MMHG | BODY MASS INDEX: 22.3 KG/M2

## 2025-03-17 DIAGNOSIS — Z00.00 WELLNESS EXAMINATION: Primary | ICD-10-CM

## 2025-03-17 DIAGNOSIS — E78.00 HIGH CHOLESTEROL: ICD-10-CM

## 2025-03-17 DIAGNOSIS — I10 ESSENTIAL HYPERTENSION: Chronic | ICD-10-CM

## 2025-03-17 DIAGNOSIS — R94.5 ABNORMAL LIVER FUNCTION: ICD-10-CM

## 2025-03-17 PROBLEM — I77.810 DILATED AORTIC ROOT: Status: RESOLVED | Noted: 2019-03-28 | Resolved: 2025-03-17

## 2025-03-17 LAB
ALPHA1 GLOB MFR UR ELPH: 138 MG/DL (ref 90–200)
BASOPHILS # BLD AUTO: 0.06 10*3/MM3 (ref 0–0.2)
BASOPHILS NFR BLD AUTO: 1 % (ref 0–1.5)
CERULOPLASMIN SERPL-MCNC: 19 MG/DL (ref 16–31)
DEPRECATED RDW RBC AUTO: 39.2 FL (ref 37–54)
EOSINOPHIL # BLD AUTO: 0.35 10*3/MM3 (ref 0–0.4)
EOSINOPHIL NFR BLD AUTO: 5.7 % (ref 0.3–6.2)
ERYTHROCYTE [DISTWIDTH] IN BLOOD BY AUTOMATED COUNT: 12.3 % (ref 12.3–15.4)
FERRITIN SERPL-MCNC: 270 NG/ML (ref 30–400)
HBV SURFACE AG SERPL QL IA: NORMAL
HCT VFR BLD AUTO: 46.5 % (ref 37.5–51)
HCV AB SER QL: NORMAL
HGB BLD-MCNC: 15.8 G/DL (ref 13–17.7)
IMM GRANULOCYTES # BLD AUTO: 0.01 10*3/MM3 (ref 0–0.05)
IMM GRANULOCYTES NFR BLD AUTO: 0.2 % (ref 0–0.5)
LYMPHOCYTES # BLD AUTO: 1.79 10*3/MM3 (ref 0.7–3.1)
LYMPHOCYTES NFR BLD AUTO: 29.4 % (ref 19.6–45.3)
MCH RBC QN AUTO: 29.8 PG (ref 26.6–33)
MCHC RBC AUTO-ENTMCNC: 34 G/DL (ref 31.5–35.7)
MCV RBC AUTO: 87.7 FL (ref 79–97)
MONOCYTES # BLD AUTO: 0.47 10*3/MM3 (ref 0.1–0.9)
MONOCYTES NFR BLD AUTO: 7.7 % (ref 5–12)
NEUTROPHILS NFR BLD AUTO: 3.41 10*3/MM3 (ref 1.7–7)
NEUTROPHILS NFR BLD AUTO: 56 % (ref 42.7–76)
NRBC BLD AUTO-RTO: 0 /100 WBC (ref 0–0.2)
PLATELET # BLD AUTO: 232 10*3/MM3 (ref 140–450)
PMV BLD AUTO: 11.9 FL (ref 6–12)
RBC # BLD AUTO: 5.3 10*6/MM3 (ref 4.14–5.8)
WBC NRBC COR # BLD AUTO: 6.09 10*3/MM3 (ref 3.4–10.8)

## 2025-03-17 PROCEDURE — 86317 IMMUNOASSAY INFECTIOUS AGENT: CPT | Performed by: INTERNAL MEDICINE

## 2025-03-17 PROCEDURE — 87340 HEPATITIS B SURFACE AG IA: CPT | Performed by: INTERNAL MEDICINE

## 2025-03-17 PROCEDURE — 86364 TISS TRNSGLTMNASE EA IG CLAS: CPT | Performed by: INTERNAL MEDICINE

## 2025-03-17 PROCEDURE — 82728 ASSAY OF FERRITIN: CPT | Performed by: INTERNAL MEDICINE

## 2025-03-17 PROCEDURE — 86803 HEPATITIS C AB TEST: CPT | Performed by: INTERNAL MEDICINE

## 2025-03-17 PROCEDURE — 86231 EMA EACH IG CLASS: CPT | Performed by: INTERNAL MEDICINE

## 2025-03-17 PROCEDURE — 36415 COLL VENOUS BLD VENIPUNCTURE: CPT | Performed by: INTERNAL MEDICINE

## 2025-03-17 PROCEDURE — 85025 COMPLETE CBC W/AUTO DIFF WBC: CPT | Performed by: INTERNAL MEDICINE

## 2025-03-17 PROCEDURE — 82390 ASSAY OF CERULOPLASMIN: CPT | Performed by: INTERNAL MEDICINE

## 2025-03-17 PROCEDURE — 86704 HEP B CORE ANTIBODY TOTAL: CPT | Performed by: INTERNAL MEDICINE

## 2025-03-17 PROCEDURE — 99396 PREV VISIT EST AGE 40-64: CPT | Performed by: INTERNAL MEDICINE

## 2025-03-17 PROCEDURE — 82103 ALPHA-1-ANTITRYPSIN TOTAL: CPT | Performed by: INTERNAL MEDICINE

## 2025-03-17 PROCEDURE — 82784 ASSAY IGA/IGD/IGG/IGM EACH: CPT | Performed by: INTERNAL MEDICINE

## 2025-03-17 NOTE — PROGRESS NOTES
Subjective        Chief Complaint   Patient presents with    Annual Exam           Tae Jung is a 54 y.o. male who presents for    Patient Active Problem List   Diagnosis    High cholesterol    Essential hypertension       History of Present Illness     He has not been checking his BP. Denies chest pain, dyspnea, nausea or abdominal pain. He exercises 4 days per week. Denies FH of liver disease.    No Known Allergies    Current Outpatient Medications on File Prior to Visit   Medication Sig Dispense Refill    lisinopril (PRINIVIL,ZESTRIL) 10 MG tablet Take 1 tablet by mouth Daily. 30 tablet 2     No current facility-administered medications on file prior to visit.   The 10-year ASCVD risk score (Bryan KURTZ, et al., 2019) is: 7.6%    Values used to calculate the score:      Age: 54 years      Sex: Male      Is Non- : No      Diabetic: No      Tobacco smoker: No      Systolic Blood Pressure: 126 mmHg      Is BP treated: Yes      HDL Cholesterol: 37 mg/dL      Total Cholesterol: 214 mg/dL     Past Medical History:   Diagnosis Date    Back pain     Dilated aortic root 03/28/2019    Gilbert's disease     Hemorrhoids     Kidney stone 10/2023    Low back pain     Low HDL (under 40)     Rectal bleeding        Past Surgical History:   Procedure Laterality Date    COLONOSCOPY  04/10/2019    repeat 10 years Dr. Montez    EXTRACORPOREAL SHOCK WAVE LITHOTRIPSY (ESWL)  10/2023    HERNIA REPAIR      KNEE ACL RECONSTRUCTION      WISDOM TOOTH EXTRACTION         Family History   Problem Relation Age of Onset    Diabetes Mother     Hypertension Mother     Hyperlipidemia Mother     Kidney disease Mother     Lung cancer Father     Cancer Father        Social History     Socioeconomic History    Marital status:    Tobacco Use    Smoking status: Never    Smokeless tobacco: Never   Vaping Use    Vaping status: Never Used   Substance and Sexual Activity    Alcohol use: Yes     Alcohol/week: 1.0 standard  "drink of alcohol     Types: 1 Glasses of wine per week     Comment: occassional    Drug use: No    Sexual activity: Yes     Partners: Female           The following portions of the patient's history were reviewed and updated as appropriate: problem list, allergies, current medications, past medical history, past family history, past social history, and past surgical history.    Review of Systems    Immunization History   Administered Date(s) Administered    COVID-19 (PFIZER) Purple Cap Monovalent 04/01/2021, 04/29/2021, 01/04/2022    DTaP 01/01/2005    Flu Vaccine Quad PF >36MO 10/01/2018    Fluzone (or Fluarix & Flulaval for VFC) >6mos 10/08/2020    Fluzone Quad >6mos (Multi-dose) 01/04/2019    Hepatitis A 07/03/2018, 01/04/2019    Tdap 04/26/2016       Objective   Vitals:    03/17/25 1509   BP: 126/88   Weight: 78.8 kg (173 lb 12.8 oz)   Height: 188 cm (74.02\")     Body mass index is 22.3 kg/m².  Physical Exam  Vitals reviewed.   Constitutional:       Appearance: He is well-developed.   HENT:      Head: Normocephalic and atraumatic.      Mouth/Throat:      Mouth: Mucous membranes are moist.      Pharynx: Oropharynx is clear.   Eyes:      Extraocular Movements: Extraocular movements intact.      Conjunctiva/sclera: Conjunctivae normal.      Pupils: Pupils are equal, round, and reactive to light.   Neck:      Thyroid: No thyromegaly.      Vascular: No carotid bruit.   Cardiovascular:      Rate and Rhythm: Normal rate and regular rhythm.      Heart sounds: Normal heart sounds. No murmur heard.  Pulmonary:      Effort: Pulmonary effort is normal.      Breath sounds: Normal breath sounds.   Abdominal:      General: There is no distension.      Palpations: Abdomen is soft. There is no mass.      Tenderness: There is no abdominal tenderness. There is no rebound.   Musculoskeletal:      Cervical back: Neck supple.   Lymphadenopathy:      Cervical: No cervical adenopathy.   Skin:     General: Skin is warm.      Comments: " Right anterior chin with scaling pink patch   Neurological:      Mental Status: He is alert.   Psychiatric:         Behavior: Behavior normal.         Procedures    Assessment & Plan   Diagnoses and all orders for this visit:    1. Wellness examination (Primary)    2. Essential hypertension    3. High cholesterol  -     Comprehensive Metabolic Panel; Future  -     Lipid Panel With / Chol / HDL Ratio; Future    4. Abnormal liver function  -     US Liver; Future  -     Hepatitis C Antibody  -     Hepatitis B Surf Antibody Quant  -     Hepatitis B Core Ab W/Reflex  -     Hepatitis B Surface Antigen  -     Celiac Disease Panel  -     Ferritin  -     Ceruloplasmin  -     Alpha - 1 - Antitrypsin  -     CBC & Differential               Recommend shingrix and recommend exercising 150 min per week. Reviewed labs. BP is better. Work up elevated LFT. Hold off on statin for borderline elevated ASCVD risk with increased LFT. Cscope is UTD.    Return in about 6 months (around 9/17/2025), or 30 minutes, for Lab Today, Lab Before FUP.

## 2025-03-18 LAB
HBV CORE AB SERPL QL IA: NEGATIVE
HBV SURFACE AB SER-ACNC: <3.5 MIU/ML

## 2025-03-19 LAB
ENDOMYSIUM IGA SER QL: NEGATIVE
IGA SERPL-MCNC: 258 MG/DL (ref 90–386)
TTG IGA SER-ACNC: <2 U/ML (ref 0–3)

## 2025-04-30 ENCOUNTER — HOSPITAL ENCOUNTER (OUTPATIENT)
Dept: ULTRASOUND IMAGING | Facility: HOSPITAL | Age: 55
Discharge: HOME | End: 2025-04-30
Admitting: INTERNAL MEDICINE
Payer: COMMERCIAL

## 2025-04-30 DIAGNOSIS — R94.5 ABNORMAL LIVER FUNCTION: ICD-10-CM

## 2025-04-30 PROCEDURE — 76705 ECHO EXAM OF ABDOMEN: CPT

## 2025-05-06 ENCOUNTER — TELEPHONE (OUTPATIENT)
Dept: INTERNAL MEDICINE | Facility: CLINIC | Age: 55
End: 2025-05-06

## 2025-05-06 NOTE — TELEPHONE ENCOUNTER
"Caller: Tae Jung \"Kip\"    Relationship: Self    Best call back number: [phone] (Mobile)     Caller requesting test results: ULTRASOUND    What test was performed: ULTRASOUND    When was the test performed: 04/30/25    Where was the test performed:     Additional notes: PATIENT CALLED TO REQUEST A CALLBACK TO DISCUSS THE RESULTS FROM HIS ULTRASOUND.     PLEASE CONTACT PATIENT TO ADVISE.          THANKS  "

## 2025-05-20 DIAGNOSIS — I10 ESSENTIAL HYPERTENSION: Chronic | ICD-10-CM

## 2025-05-21 RX ORDER — LISINOPRIL 10 MG/1
10 TABLET ORAL DAILY
Qty: 30 TABLET | Refills: 2 | Status: SHIPPED | OUTPATIENT
Start: 2025-05-21

## 2025-06-02 DIAGNOSIS — I10 ESSENTIAL HYPERTENSION: Chronic | ICD-10-CM

## 2025-06-02 RX ORDER — LISINOPRIL 10 MG/1
10 TABLET ORAL DAILY
Qty: 30 TABLET | Refills: 2 | Status: SHIPPED | OUTPATIENT
Start: 2025-06-02

## 2025-06-02 NOTE — TELEPHONE ENCOUNTER
"    Caller: Tae Jung \"Kip\"    Relationship: Self    Best call back number: [phone]     Requested Prescriptions:   Requested Prescriptions     Pending Prescriptions Disp Refills    lisinopril (PRINIVIL,ZESTRIL) 10 MG tablet 30 tablet 2     Sig: Take 1 tablet by mouth Daily.        Pharmacy where request should be sent: [facility] [id number] [facility] [place] [address] [facility]  AT [address] [phone] [facility] [phone]      Last office visit with prescribing clinician: 3/17/2025   Last telemedicine visit with prescribing clinician: Visit date not found   Next office visit with prescribing clinician: 9/19/2025       Oh Pineda Rep   06/02/25 08:10 EDT         "

## 2025-07-23 DIAGNOSIS — I10 ESSENTIAL HYPERTENSION: Chronic | ICD-10-CM

## 2025-07-23 RX ORDER — LISINOPRIL 10 MG/1
10 TABLET ORAL DAILY
Qty: 30 TABLET | Refills: 2 | Status: SHIPPED | OUTPATIENT
Start: 2025-07-23

## 2025-07-23 NOTE — TELEPHONE ENCOUNTER
"  Caller: Tae Jung \"Kip\"    Relationship: Self    Best call back number: [phone]     Requested Prescriptions:   Requested Prescriptions     Pending Prescriptions Disp Refills    lisinopril (PRINIVIL,ZESTRIL) 10 MG tablet 30 tablet 2     Sig: Take 1 tablet by mouth Daily.        Pharmacy where request should be sent: [facility] [id number] [facility] [place] [address] [facility]  AT [address] [phone] [facility] [phone]      Last office visit with prescribing clinician: 3/17/2025   Last telemedicine visit with prescribing clinician: Visit date not found   Next office visit with prescribing clinician: 9/19/2025     Additional details provided by patient: [facility] PHARM[facility]Y STATES THEY DO NOT HAVE ANY REFILLS ON FILE NEEDS A NEW PRESCRIPTION SENT     Does the patient have less than a 3 day supply:  [x] Yes  [] No    Would you like a call back once the refill request has been completed: [] Yes [x] No    If the office needs to give you a call back, can they leave a voicemail: [] Yes [x] No    hO Paul Rep   07/23/25 09:19 EDT           "